# Patient Record
Sex: FEMALE | Race: WHITE
[De-identification: names, ages, dates, MRNs, and addresses within clinical notes are randomized per-mention and may not be internally consistent; named-entity substitution may affect disease eponyms.]

---

## 2017-10-09 NOTE — PREOPHP
DATE OF ADMISSION: 10/10/2017

 

 

HISTORY OF PRESENT ILLNESS:  This 62-year-old patient is admitted for elective 
cataract surgery of the right eye.  The patient has had a decrease of vision 
over the past 6 to 12 months involving both eyes without prior history of eye 
disease or injury.  The patient has a positive systemic history for insulin-
dependent diabetes mellitus for the past 27 years, systemic hypertension, 
hypercholesterolemia and GERD.

 

CURRENT MEDICATIONS INCLUDE:

1.  Lantus insulin.

2.  Metformin.

3.  Losartan.

4.  Omeprazole.

5.  Atorvastatin.

6.  Aspirin (discontinued 1 week prior to surgery).

 

ALLERGIES:  THE PATIENT IS ALLERGIC TO CODEINE.

 

PHYSICAL EXAMINATION:  Visual acuity with best correction is 20/70 in the right 
eye and 20/40 in the left eye.  Slit lamp examination reveals moderate nuclear 
sclerotic opacities and posterior subcapsular cataract formation, greater in 
the right eye than the left eye.  Applanation tonometry is 24 mmHg.  
Examination of the retina is within normal limits.  There is no evidence of 
diabetic retinopathy.

 

DIAGNOSIS:  Posterior subcapsular cataract, right eye.

 

PLAN:  Cataract extraction with lens implant, right eye.  The risks and 
alternatives to the surgery have been discussed with the patient as well as the 
hope for improvement of visual acuity leading to greater ability to perform 
activities of daily living.  The patient understands this and agrees to proceed 
with surgery.

 

 

Dictated By: MARIPOSA CHILDERS/PETE

DD:    10/09/2017 09:01:58

DT:    10/09/2017 09:29:37

Conf#: [id number]

DID#:  [id number]

 

MTDD

## 2017-10-10 ENCOUNTER — HOSPITAL ENCOUNTER (OUTPATIENT)
Dept: HOSPITAL 10 - SDS | Age: 62
Discharge: HOME | End: 2017-10-10
Attending: OPHTHALMOLOGY
Payer: COMMERCIAL

## 2017-10-10 VITALS — SYSTOLIC BLOOD PRESSURE: 161 MMHG | DIASTOLIC BLOOD PRESSURE: 63 MMHG | HEART RATE: 102 BPM | RESPIRATION RATE: 16 BRPM

## 2017-10-10 VITALS — RESPIRATION RATE: 20 BRPM | SYSTOLIC BLOOD PRESSURE: 168 MMHG | HEART RATE: 105 BPM | DIASTOLIC BLOOD PRESSURE: 74 MMHG

## 2017-10-10 VITALS — DIASTOLIC BLOOD PRESSURE: 63 MMHG | HEART RATE: 102 BPM | SYSTOLIC BLOOD PRESSURE: 161 MMHG | RESPIRATION RATE: 20 BRPM

## 2017-10-10 VITALS — DIASTOLIC BLOOD PRESSURE: 69 MMHG | HEART RATE: 100 BPM | SYSTOLIC BLOOD PRESSURE: 157 MMHG

## 2017-10-10 VITALS — DIASTOLIC BLOOD PRESSURE: 75 MMHG | SYSTOLIC BLOOD PRESSURE: 167 MMHG | HEART RATE: 99 BPM

## 2017-10-10 VITALS — SYSTOLIC BLOOD PRESSURE: 160 MMHG | DIASTOLIC BLOOD PRESSURE: 71 MMHG | HEART RATE: 99 BPM

## 2017-10-10 VITALS — SYSTOLIC BLOOD PRESSURE: 140 MMHG | HEART RATE: 100 BPM | RESPIRATION RATE: 19 BRPM | DIASTOLIC BLOOD PRESSURE: 64 MMHG

## 2017-10-10 VITALS — HEART RATE: 122 BPM | SYSTOLIC BLOOD PRESSURE: 140 MMHG | DIASTOLIC BLOOD PRESSURE: 81 MMHG | RESPIRATION RATE: 23 BRPM

## 2017-10-10 VITALS — RESPIRATION RATE: 18 BRPM | SYSTOLIC BLOOD PRESSURE: 152 MMHG | HEART RATE: 102 BPM | DIASTOLIC BLOOD PRESSURE: 72 MMHG

## 2017-10-10 VITALS — HEART RATE: 106 BPM | DIASTOLIC BLOOD PRESSURE: 66 MMHG | SYSTOLIC BLOOD PRESSURE: 168 MMHG | RESPIRATION RATE: 19 BRPM

## 2017-10-10 VITALS — SYSTOLIC BLOOD PRESSURE: 159 MMHG | DIASTOLIC BLOOD PRESSURE: 77 MMHG | HEART RATE: 100 BPM

## 2017-10-10 VITALS — HEART RATE: 106 BPM | RESPIRATION RATE: 18 BRPM | SYSTOLIC BLOOD PRESSURE: 153 MMHG | DIASTOLIC BLOOD PRESSURE: 64 MMHG

## 2017-10-10 VITALS — HEART RATE: 98 BPM | DIASTOLIC BLOOD PRESSURE: 71 MMHG | SYSTOLIC BLOOD PRESSURE: 170 MMHG

## 2017-10-10 VITALS — DIASTOLIC BLOOD PRESSURE: 70 MMHG | HEART RATE: 100 BPM | SYSTOLIC BLOOD PRESSURE: 160 MMHG

## 2017-10-10 VITALS
HEIGHT: 64 IN | BODY MASS INDEX: 31.62 KG/M2 | HEIGHT: 64 IN | WEIGHT: 185.19 LBS | WEIGHT: 185.19 LBS | BODY MASS INDEX: 31.62 KG/M2

## 2017-10-10 VITALS — DIASTOLIC BLOOD PRESSURE: 62 MMHG | HEART RATE: 98 BPM | RESPIRATION RATE: 19 BRPM | SYSTOLIC BLOOD PRESSURE: 161 MMHG

## 2017-10-10 DIAGNOSIS — I10: ICD-10-CM

## 2017-10-10 DIAGNOSIS — E11.9: ICD-10-CM

## 2017-10-10 DIAGNOSIS — H25.11: Primary | ICD-10-CM

## 2017-10-10 LAB
ADD UMIC: YES
APTT BLD: 32.8 SEC (ref 25–35)
BACTERIA #/AREA URNS HPF: (no result) /HPF
BASOPHILS # BLD AUTO: 0 10^3/UL (ref 0–0.1)
BASOPHILS NFR BLD: 0.4 % (ref 0–2)
COLOR UR: (no result)
EOSINOPHIL # BLD: 0.1 10^3/UL (ref 0–0.5)
EOSINOPHIL NFR BLD: 1.4 % (ref 0–7)
ERYTHROCYTE [DISTWIDTH] IN BLOOD BY AUTOMATED COUNT: 12.2 % (ref 11.5–14.5)
GLUCOSE UR STRIP-MCNC: (no result) MG/DL
HCT VFR BLD CALC: 36.2 % (ref 37–47)
HGB BLD-MCNC: 11.9 G/DL (ref 12–16)
INR PPP: 0.94
KETONES UR STRIP.AUTO-MCNC: NEGATIVE MG/DL
LYMPHOCYTES # BLD AUTO: 2.2 10^3/UL (ref 0.8–2.9)
LYMPHOCYTES NFR BLD AUTO: 24.4 % (ref 15–51)
MCH RBC QN AUTO: 30.9 PG (ref 29–33)
MCHC RBC AUTO-ENTMCNC: 32.9 G/DL (ref 32–37)
MCV RBC AUTO: 94 FL (ref 82–101)
MONOCYTES # BLD: 0.5 10^3/UL (ref 0.3–0.9)
MONOCYTES NFR BLD: 5.8 % (ref 0–11)
NEUTROPHILS # BLD: 6.2 10^3/UL (ref 1.6–7.5)
NEUTROPHILS NFR BLD AUTO: 67.7 % (ref 39–77)
NITRITE UR QL STRIP.AUTO: NEGATIVE MG/DL
NRBC # BLD MANUAL: 0 10^3/UL (ref 0–0)
NRBC BLD AUTO-RTO: 0 /100WBC (ref 0–0)
PLATELET # BLD: 311 10^3/UL (ref 140–415)
PMV BLD AUTO: 8.6 FL (ref 7.4–10.4)
PROTHROMBIN TIME: 12.6 SEC (ref 12.2–14.2)
PT RATIO: 1
RBC # BLD AUTO: 3.85 10^6/UL (ref 4.2–5.4)
RBC # UR AUTO: (no result) MG/DL
SQUAMOUS #/AREA URNS HPF: (no result) /HPF
UR ASCORBIC ACID: NEGATIVE MG/DL
UR BILIRUBIN (DIP): NEGATIVE MG/DL
UR CLARITY: CLEAR
UR PH (DIP): 5 (ref 5–9)
UR RBC: 1 /HPF (ref 0–5)
UR SPECIFIC GRAVITY (DIP): 1 (ref 1–1.03)
UR TOTAL PROTEIN (DIP): NEGATIVE MG/DL
UROBILINOGEN UR STRIP-ACNC: NEGATIVE MG/DL
WBC # BLD AUTO: 9.2 10^3/UL (ref 4.8–10.8)
WBC # UR STRIP: (no result) LEU/UL

## 2017-10-10 PROCEDURE — V2632 POST CHMBR INTRAOCULAR LENS: HCPCS

## 2017-10-10 PROCEDURE — 81001 URINALYSIS AUTO W/SCOPE: CPT

## 2017-10-10 PROCEDURE — 82962 GLUCOSE BLOOD TEST: CPT

## 2017-10-10 PROCEDURE — 66984 XCAPSL CTRC RMVL W/O ECP: CPT

## 2017-10-10 PROCEDURE — 93005 ELECTROCARDIOGRAM TRACING: CPT

## 2017-10-10 PROCEDURE — 85025 COMPLETE CBC W/AUTO DIFF WBC: CPT

## 2017-10-10 PROCEDURE — 85610 PROTHROMBIN TIME: CPT

## 2017-10-10 PROCEDURE — 85730 THROMBOPLASTIN TIME PARTIAL: CPT

## 2017-10-10 PROCEDURE — 71010: CPT

## 2017-10-10 RX ADMIN — HYDRALAZINE HYDROCHLORIDE PRN MG: 20 INJECTION INTRAMUSCULAR; INTRAVENOUS at 09:39

## 2017-10-10 RX ADMIN — HYDRALAZINE HYDROCHLORIDE PRN MG: 20 INJECTION INTRAMUSCULAR; INTRAVENOUS at 09:31

## 2017-10-10 NOTE — OPR
DATE OF OPERATION:  10/10/2017

 

 

PREOPERATIVE DIAGNOSIS:  Cataract, right eye.

 

POSTOPERATIVE DIAGNOSIS:  Cataract, right eye.

 

OPERATION PERFORMED:  Cataract extraction with lens implant, right eye.

 

SURGEON:  Dr. Mariposa Bhandari.

 

ANESTHESIA:  Dr. Víctor Gill.

 

PROCEDURE:  The patient was brought to the operating room and placed on the table with an IV in plac
e and the patient attached to an EKG monitor. Oxygen was given via face mask.

 

After some intravenous sedation was administered, local anesthesia was given using Xylocaine 2% with
 epinephrine, mixed with Marcaine 0.5%. This was given in a lid block and retrobulbar injection. The
 patient was then prepped and draped in the usual sterile manner.

 

A wire lid speculum was inserted between the lids of the right eye. A Superblade was used to enter t
he anterior chamber at the corneoscleral limbus at the 10:30 o'clock position. A separate incision w
as made using a 3.0-mm keratome which entered the corneoscleral junction at the 12 o'clock position.
 Through this 3-mm opening, an irrigating cystotome was introduced into the anterior chamber. The ch
thiago was filled with Viscoat and an anterior capsulotomy was performed. Balanced salt solution was 
then used for hydrodissection of the lens.  A phacoemulsification handpiece was then brought into th
e field and introduced into the anterior chamber. The lens nucleus was emulsified using a deep groov
e and cracking the nucleus into quadrants. Following this, each quadrant was aspirated and emulsifie
d at the pupillary margin.

 

After this was completed, the irrigation/aspiration handpiece was brought to the field, introduced i
nto the posterior chamber, and the lens cortical material was removed. When this was completed, elsa
tional Provisc was injected into the anterior and posterior chambers.

 

The 3-mm opening had its internal lips enlarged, and then the posterior chamber intraocular lens sukhjinder
suring 22.0 diopters ([employer] model LI61AO) was then injected into the posterior
 chamber using the lens injector system. After the leading haptic was introduced into the capsular b
ag and the lens optic was present in the center of the eye, the injector was removed and the trailin
g haptic was grasped with non-toothed forceps and introduced into the capsular fold superiorly. A Si
nskey hook was then used to rotate the intraocular lens so that the lips were oriented in the horizo
ntal meridian. One 10-0 nylon suture was placed across the wound.

 

Prior to tying, the irrigation/aspiration handpiece was reintroduced into the anterior chamber to re
move the Viscoat. Miochol was instilled to constrict the pupil, and then the 10-0 nylon suture was t
ied. The ends were cut short and then the knot was buried.

 

Then, 0.5 mL of dexamethasone and 0.5 mL of Ancef were injected into the sub-Tenon space in the infe
rior fornix.  Ciloxan drops were then placed on the surface of the eye. The speculum was removed and
 a patch was applied.

 

The patient then left the operating room in satisfactory condition.

 

 

Dictated By: MARIPOSA CHILDERS/PETE

DD:    10/10/2017 08:59:32

DT:    10/10/2017 11:16:02

Conf#: [id number]

DID#:  [id number]

## 2017-10-10 NOTE — SIPON
Date/Time of Note


Date/Time of Note


DATE: 10/10/17 


TIME: 09:07





Operative Report


Preoperative Diagnosis


cataract os


Postoperative Diagnosis


same


Operation/Procedure Performed


cataract extraction lens implant os


Surgeon


B Elisabet


First assist


none


Anesthesia:  MAC


Estimated blood loss:  none


Transfusion Required


   none


Specimen


none


Grafts/Implants


lens implant


Complications


none











MARIPOSA SMITH MD Oct 10, 2017 09:09

## 2017-10-10 NOTE — RADRPT
PROCEDURE:   XR Chest. 

 

CLINICAL INDICATION:   Preoperative for right cataract surgery.

 

TECHNIQUE:   Single frontal view. 

 

COMPARISON:   None. 

 

FINDINGS:

The lungs are clear. 

The heart size is normal. 

There is no pleural effusion. 

There is no pneumothorax.   

 

IMPRESSION:

1.  Normal chest radiograph.

 

RPTAT: QQ

_____________________________________________ 

.Nithin King MD, MD           Date    Time 

Electronically viewed and signed by .Nithin King MD, MD on 10/10/2017 11:33 

 

D:  10/10/2017 11:33  T:  10/10/2017 11:33

.R/

## 2017-10-13 NOTE — RADRPT
Vent Rate: 101 bpm

RR Interval: 0 msec

SC Interval: 130 msec

QRS Duration: 66 msec

QT Interval: 350 msec

QTC Interval: 453 msec

P-R-T Axis: 36 - 18 - 52 degrees

 

 Sinus tachycardia

 Cannot rule out Anterior infarct , age undetermined

 Abnormal ECG

 

Electronically Signed By: Abdiaziz Burris

[id number]

## 2017-12-18 NOTE — PREOPHP
DATE OF ADMISSION: 12/19/2017

 

HISTORY OF PRESENT ILLNESS:  This 62-year-old patient is admitted for elective cataract surgery of t
he left eye.  The patient has had decrease of vision over the past 12 months with no prior history o
f eye disease or injury.  The patient does have a positive systemic history of insulin-dependent gilmer
betes mellitus for the past 27 years as well as systemic hypertension, hypercholesterolemia and GERD
.

 

CURRENT MEDICATIONS INCLUDE:

1.  Lantus insulin.

2.  Metformin.

3.  Losartan.

4.  Omeprazole.

5.  Atorvastatin.

6.  Aspirin (discontinued 1 week prior to surgery).

 

ALLERGIES:  PATIENT IS ALLERGIC TO CODEINE.

 

PHYSICAL EXAMINATION:  The visual acuity with best correction is 20/25 in the right eye and 20/50 in
 the left eye.  Slit lamp examination reveals moderate nuclear sclerotic cataract with a posterior s
ubcapsular central opacification in the left eye.  The right eye has a posterior chamber intraocular
 lens in appropriate position.  Applanation tonometry is 22 mmHg in the left eye.  The patient is cu
rrently using latanoprost for elevated intraocular pressure in the right eye since the postoperative
 period of approximately 6 weeks ago.

 

DIAGNOSIS:  Posterior subcapsular cataract, left eye.

 

PLAN:  Cataract extraction with lens implant, left eye.  The risks and alternatives to the surgery h
ave been discussed with the patient as well as the hope for improvement of visual acuity, similar to
 that obtained in the right eye, in order to help patient improve ability to perform activities of d
aily living.  The patient understands this and agrees to proceed with surgery.

 

 

Dictated By: MARIPOSA CHILDERS/PETE

DD:    12/18/2017 10:28:55

DT:    12/18/2017 10:44:23

Conf#: [id number]

DID#:  [id number]

## 2017-12-19 ENCOUNTER — HOSPITAL ENCOUNTER (OUTPATIENT)
Dept: HOSPITAL 10 - SDS | Age: 62
Discharge: HOME | End: 2017-12-19
Attending: OPHTHALMOLOGY
Payer: COMMERCIAL

## 2017-12-19 VITALS — HEART RATE: 98 BPM | RESPIRATION RATE: 21 BRPM | DIASTOLIC BLOOD PRESSURE: 69 MMHG | SYSTOLIC BLOOD PRESSURE: 128 MMHG

## 2017-12-19 VITALS — HEART RATE: 98 BPM | RESPIRATION RATE: 23 BRPM | SYSTOLIC BLOOD PRESSURE: 138 MMHG | DIASTOLIC BLOOD PRESSURE: 64 MMHG

## 2017-12-19 VITALS — DIASTOLIC BLOOD PRESSURE: 59 MMHG | HEART RATE: 94 BPM | SYSTOLIC BLOOD PRESSURE: 131 MMHG | RESPIRATION RATE: 18 BRPM

## 2017-12-19 VITALS — DIASTOLIC BLOOD PRESSURE: 56 MMHG | RESPIRATION RATE: 18 BRPM | SYSTOLIC BLOOD PRESSURE: 134 MMHG | HEART RATE: 96 BPM

## 2017-12-19 VITALS — SYSTOLIC BLOOD PRESSURE: 136 MMHG | RESPIRATION RATE: 17 BRPM | DIASTOLIC BLOOD PRESSURE: 54 MMHG | HEART RATE: 96 BPM

## 2017-12-19 VITALS — HEART RATE: 96 BPM | RESPIRATION RATE: 17 BRPM | DIASTOLIC BLOOD PRESSURE: 69 MMHG | SYSTOLIC BLOOD PRESSURE: 130 MMHG

## 2017-12-19 VITALS — SYSTOLIC BLOOD PRESSURE: 145 MMHG | HEART RATE: 98 BPM | RESPIRATION RATE: 21 BRPM | DIASTOLIC BLOOD PRESSURE: 65 MMHG

## 2017-12-19 VITALS — RESPIRATION RATE: 16 BRPM | DIASTOLIC BLOOD PRESSURE: 70 MMHG | HEART RATE: 101 BPM | SYSTOLIC BLOOD PRESSURE: 136 MMHG

## 2017-12-19 VITALS — SYSTOLIC BLOOD PRESSURE: 146 MMHG | RESPIRATION RATE: 26 BRPM | HEART RATE: 98 BPM | DIASTOLIC BLOOD PRESSURE: 64 MMHG

## 2017-12-19 VITALS — RESPIRATION RATE: 20 BRPM | DIASTOLIC BLOOD PRESSURE: 67 MMHG | SYSTOLIC BLOOD PRESSURE: 136 MMHG | HEART RATE: 96 BPM

## 2017-12-19 VITALS — DIASTOLIC BLOOD PRESSURE: 65 MMHG | RESPIRATION RATE: 20 BRPM | SYSTOLIC BLOOD PRESSURE: 140 MMHG | HEART RATE: 95 BPM

## 2017-12-19 VITALS
WEIGHT: 188.72 LBS | HEIGHT: 62 IN | BODY MASS INDEX: 34.73 KG/M2 | HEIGHT: 62 IN | WEIGHT: 188.72 LBS | BODY MASS INDEX: 34.73 KG/M2

## 2017-12-19 DIAGNOSIS — E78.00: ICD-10-CM

## 2017-12-19 DIAGNOSIS — E78.5: ICD-10-CM

## 2017-12-19 DIAGNOSIS — Z79.82: ICD-10-CM

## 2017-12-19 DIAGNOSIS — D64.9: ICD-10-CM

## 2017-12-19 DIAGNOSIS — Z79.4: ICD-10-CM

## 2017-12-19 DIAGNOSIS — Z79.84: ICD-10-CM

## 2017-12-19 DIAGNOSIS — E11.9: ICD-10-CM

## 2017-12-19 DIAGNOSIS — I10: ICD-10-CM

## 2017-12-19 DIAGNOSIS — H25.12: Primary | ICD-10-CM

## 2017-12-19 PROCEDURE — 93005 ELECTROCARDIOGRAM TRACING: CPT

## 2017-12-19 PROCEDURE — 66984 XCAPSL CTRC RMVL W/O ECP: CPT

## 2017-12-19 PROCEDURE — V2632 POST CHMBR INTRAOCULAR LENS: HCPCS

## 2017-12-19 PROCEDURE — 82962 GLUCOSE BLOOD TEST: CPT

## 2017-12-19 NOTE — OPR
DATE OF OPERATION:  12/19/2017

 

 

PREOPERATIVE DIAGNOSIS:  Cataract, left eye.

 

POSTOPERATIVE DIAGNOSIS:  Cataract, left eye.

 

OPERATION PERFORMED:  Cataract extraction with lens implant, left eye.

 

SURGEON:  Mariposa Bhandari MD.

 

ANESTHESIA:  Agatha Maharaj CRNA. 

 

PROCEDURE:  The patient was brought to the operating room and placed on the table with an IV in plac
e and the patient attached to an EKG monitor. Oxygen was given via face mask.

 

After some intravenous sedation was administered, local anesthesia was given using Xylocaine 2% with
 epinephrine, mixed with Marcaine 0.5%. This was given in a lid block and retrobulbar injection. The
 patient was then prepped and draped in the usual sterile manner.

 

A wire lid speculum was inserted between the lids of the left eye. A Superblade was used to enter th
e anterior chamber at the corneoscleral limbus at the 10:30 o'clock position. A separate incision wa
s made using a 3.0-mm keratome which entered the corneoscleral junction at the 12 o'clock position. 
Through this 3-mm opening, an irrigating cystotome was introduced into the anterior chamber. The rebekah
mber was filled with Viscoat and an anterior capsulotomy was performed. Balanced salt solution was t
hen used for hydrodissection of the lens.  A phacoemulsification handpiece was then brought into the
 field and introduced into the anterior chamber. The lens nucleus was emulsified using a deep groove
 and cracking the nucleus into quadrants. Following this, each quadrant was aspirated and emulsified
 at the pupillary margin.

 

After this was completed, the irrigation/aspiration handpiece was brought to the field, introduced i
nto the posterior chamber, and the lens cortical material was removed. When this was completed, elsa
tional Provisc was injected into the anterior and posterior chambers.

 

The 3-mm opening had its internal lips enlarged, and then the posterior chamber intraocular lens sukhjinder
suring 21.5 diopters ([employer], model LI61A0) was then injected into the posterio
r chamber using the lens injector system. After the leading haptic was introduced into the capsular 
bag and the lens optic was present in the center of the eye, the injector was removed and the traili
ng haptic was grasped with non-toothed forceps and introduced into the capsular fold superiorly. A S
inskey hook was then used to rotate the intraocular lens so that the lips were oriented in the horiz
ontal meridian. One 10-0 nylon suture was placed across the wound.

 

Prior to tying, the irrigation/aspiration handpiece was reintroduced into the anterior chamber to re
move the Viscoat. Miochol was instilled to constrict the pupil, and then the 10-0 nylon suture was t
ied. The ends were cut short and then the knot was buried.

 

Then, 0.5 mL of dexamethasone and 0.5 mL of Ancef were injected into the sub-Tenon space in the infe
rior fornix.  Ciloxan drops were then placed on the surface of the eye. The speculum was removed and
 a patch was applied.

 

The patient then left the operating room in satisfactory condition.

 

 

Dictated By: MARIPOSA CHILDERS/PETE

DD:    12/19/2017 09:52:35

DT:    12/19/2017 10:34:06

Conf#: [id number]

DID#:  [id number]

## 2017-12-20 NOTE — RADRPT
Vent Rate: 100 bpm

RR Interval: 0 msec

TX Interval: 124 msec

QRS Duration: 64 msec

QT Interval: 348 msec

QTC Interval: 448 msec

P-R-T Axis: 41 - 21 - 52 degrees

 

 Normal sinus rhythm

 Normal ECG

 

Electronically Signed By: Papa Wilson

[id number]

## 2018-11-19 ENCOUNTER — HOSPITAL ENCOUNTER (OUTPATIENT)
Age: 63
Discharge: HOME | End: 2018-11-19

## 2018-11-19 ENCOUNTER — HOSPITAL ENCOUNTER (OUTPATIENT)
Dept: HOSPITAL 91 - GIL | Age: 63
Discharge: HOME | End: 2018-11-19
Payer: COMMERCIAL

## 2018-11-19 DIAGNOSIS — E11.9: ICD-10-CM

## 2018-11-19 DIAGNOSIS — K64.4: ICD-10-CM

## 2018-11-19 DIAGNOSIS — I10: ICD-10-CM

## 2018-11-19 DIAGNOSIS — E66.9: ICD-10-CM

## 2018-11-19 DIAGNOSIS — E78.5: ICD-10-CM

## 2018-11-19 DIAGNOSIS — K64.8: ICD-10-CM

## 2018-11-19 DIAGNOSIS — K21.0: ICD-10-CM

## 2018-11-19 DIAGNOSIS — R19.5: Primary | ICD-10-CM

## 2018-11-19 DIAGNOSIS — D12.2: ICD-10-CM

## 2018-11-19 DIAGNOSIS — K29.50: ICD-10-CM

## 2018-11-19 DIAGNOSIS — K31.7: ICD-10-CM

## 2018-11-19 PROCEDURE — 88305 TISSUE EXAM BY PATHOLOGIST: CPT

## 2018-11-19 PROCEDURE — 88312 SPECIAL STAINS GROUP 1: CPT

## 2018-11-19 PROCEDURE — 82962 GLUCOSE BLOOD TEST: CPT

## 2018-11-19 PROCEDURE — 43239 EGD BIOPSY SINGLE/MULTIPLE: CPT

## 2019-09-13 ENCOUNTER — HOSPITAL ENCOUNTER (OUTPATIENT)
Dept: HOSPITAL 91 - GIL | Age: 64
Discharge: HOME | End: 2019-09-13
Payer: COMMERCIAL

## 2019-09-13 ENCOUNTER — HOSPITAL ENCOUNTER (OUTPATIENT)
Dept: HOSPITAL 10 - GIL | Age: 64
Discharge: HOME | End: 2019-09-13
Attending: INTERNAL MEDICINE
Payer: COMMERCIAL

## 2019-09-13 VITALS — DIASTOLIC BLOOD PRESSURE: 85 MMHG | RESPIRATION RATE: 20 BRPM | SYSTOLIC BLOOD PRESSURE: 202 MMHG | HEART RATE: 93 BPM

## 2019-09-13 VITALS
HEIGHT: 65 IN | WEIGHT: 194.23 LBS | BODY MASS INDEX: 32.36 KG/M2 | BODY MASS INDEX: 32.36 KG/M2 | HEIGHT: 65 IN | WEIGHT: 194.23 LBS

## 2019-09-13 VITALS — RESPIRATION RATE: 20 BRPM | DIASTOLIC BLOOD PRESSURE: 52 MMHG | SYSTOLIC BLOOD PRESSURE: 142 MMHG

## 2019-09-13 DIAGNOSIS — E11.9: ICD-10-CM

## 2019-09-13 DIAGNOSIS — Z12.11: Primary | ICD-10-CM

## 2019-09-13 DIAGNOSIS — I10: ICD-10-CM

## 2019-09-13 DIAGNOSIS — Z79.84: ICD-10-CM

## 2019-09-13 DIAGNOSIS — K64.4: ICD-10-CM

## 2019-09-13 DIAGNOSIS — Z79.4: ICD-10-CM

## 2019-09-13 DIAGNOSIS — K57.30: ICD-10-CM

## 2019-09-13 DIAGNOSIS — Z79.82: ICD-10-CM

## 2019-09-13 PROCEDURE — 45378 DIAGNOSTIC COLONOSCOPY: CPT

## 2019-09-13 PROCEDURE — 82962 GLUCOSE BLOOD TEST: CPT

## 2024-10-23 NOTE — SIPON
Date/Time of Note


Date/Time of Note


DATE: 12/19/17 


TIME: 09:58





Operative Report


Preoperative Diagnosis


cataract os


Postoperative Diagnosis


same


Operation/Procedure Performed


cataract lens implant


Surgeon


see signature line


First assist


none


Anesthesia:  MAC


Estimated blood loss:  none


Transfusion Required


   none


Specimen


none


Grafts/Implants


posterior chamber lens implant


Complications


none











MARIPOSA SMITH MD Dec 19, 2017 09:59 none